# Patient Record
Sex: MALE | Race: WHITE | URBAN - METROPOLITAN AREA
[De-identification: names, ages, dates, MRNs, and addresses within clinical notes are randomized per-mention and may not be internally consistent; named-entity substitution may affect disease eponyms.]

---

## 2017-06-07 ENCOUNTER — TRANSFERRED RECORDS (OUTPATIENT)
Dept: HEALTH INFORMATION MANAGEMENT | Facility: CLINIC | Age: 82
End: 2017-06-07

## 2017-06-14 ENCOUNTER — TRANSFERRED RECORDS (OUTPATIENT)
Dept: HEALTH INFORMATION MANAGEMENT | Facility: CLINIC | Age: 82
End: 2017-06-14

## 2017-06-16 ENCOUNTER — TRANSFERRED RECORDS (OUTPATIENT)
Dept: HEALTH INFORMATION MANAGEMENT | Facility: CLINIC | Age: 82
End: 2017-06-16

## 2017-09-12 ENCOUNTER — TRANSFERRED RECORDS (OUTPATIENT)
Dept: HEALTH INFORMATION MANAGEMENT | Facility: CLINIC | Age: 82
End: 2017-09-12

## 2017-09-20 ENCOUNTER — TRANSFERRED RECORDS (OUTPATIENT)
Dept: HEALTH INFORMATION MANAGEMENT | Facility: CLINIC | Age: 82
End: 2017-09-20

## 2017-09-27 ENCOUNTER — TRANSFERRED RECORDS (OUTPATIENT)
Dept: HEALTH INFORMATION MANAGEMENT | Facility: CLINIC | Age: 82
End: 2017-09-27

## 2017-09-28 ENCOUNTER — MEDICAL CORRESPONDENCE (OUTPATIENT)
Dept: HEALTH INFORMATION MANAGEMENT | Facility: CLINIC | Age: 82
End: 2017-09-28

## 2017-09-28 ENCOUNTER — PRE VISIT (OUTPATIENT)
Dept: RADIATION ONCOLOGY | Facility: CLINIC | Age: 82
End: 2017-09-28

## 2017-09-28 NOTE — TELEPHONE ENCOUNTER
1.  Date/reason for appt: 10/4/2017, bone mets    2.  Referring provider: Dr. Eden Funk    3.  Call to patient (Yes / No - short description): Yes, called patient to schedule consult    4.  Previous care at / records requested from: [facility] ([facility] is putting in records request), [facility] (faxed request for images and reports)

## 2017-10-02 NOTE — PROGRESS NOTES
RADIATION ONCOLOGY CONSULT NOTE    Date of Visit: Oct 4, 2017  Patient Name: Renny Arndt  MRN: [id number]  : 1935    Renny Arndt is being seen today for initial consultation at the request of Dr. Eden Funk for consideration of radiation therapy.    HISTORY OF PRESENT ILLNESS:  Mr. Arndt is a 82 year old male with metastatic prostate cancer and multiple myeloma.    May 2012: Diagnosed with IgG lambda multiple myeloma.    May 7, 2012: Found to have a right rib soft tissue mass measuring 6 cm with 2 additional lytic lesions in T12 and L4, biopsy confirmation of plasmacytoma. Bone marrow biopsy showed IgG lambda multiple myeloma with 30-40% atypical lambda clonal plasma cells and M spike of 4.85. ISS stage II based on albumin of 3.4 and beta-2 microglobulin of 3.7    2012 to 2013: He underwent treatment with Revlimid and dexamethasone.    May 2012: Started Aredia. Switched to Zometa on May 29, 2013.    2014: Diagnosed with Earlimart 5+4 prostate adenocarcinoma with baseline PSA of 15, on prostate biopsy with 6 out of 7 cores on the right and 9 of 9 cores on the left with Earlimart 4+5; involved imaging also showed multiple enlarged pelvic lymph nodes. He began androgen deprivation therapy.    2016: Underwent orchiectomy.     May 2016: Initiated Enzalutamide.    2016 PSA neeru to 15: Patient started on abiraterone and prednisone.    2017. PSA continued to rise and patient began Taxotere. This was later discontinued due to actively bleeding peptic ulcers.     2017: Bone scan showed increased uptake in the right upper cervical spine, bilateral ribs, left T3 transverse process, and left pelvis that were stable, along with several new small left pelvic lesions. No uptake in numerous sites of bone lucencies representing patient's known multiple myeloma. MRI of R tib/fib showed no cortical or intramedullary lesions; no fracture; only nonspecific edema in multiple  muscle groups.    July 20, 2017: Initiated bicalutamide.    He recently presented with right leg pain. MRI on 9/27/2017 showed numerous marrow replacing lesions in the lower lumbar spine, pelvis, and proximal hips. There was an extensive metastatic lesion in the L5 vertebral body extending into the right pedicle and transverse process with edema and enhancement in the soft tissues adjacent, consistent with pathologic fracture with small hematoma or metastatic extension into the soft tissue with tissue necrosis. There is also an additional nonenhancing complex heterogeneous area posterior to the right L5 pedicle measuring 3.5 x 1.5 x 1 cm. There was also a moderate sized metastatic focus in the posterior left acetabulum measuring 3.1 x 3.2 x 4.1 cm.     9/2017: PSA ~52-53    Today he states he has pain in his right lower back which is rated 6 out of 10 in severity at its worst. He occasionally takes hydrocodone and Tylenol for the pain. This pain has been present for about a year however it became noticeably worse over the summer. There was no inciting event that he can remember that caused this. The pain is aggravated by certain positions such as sitting for long time, but he is able to walk without difficulty. He has no weakness in his lower extremities. He does have paresthesias and numbness in his right medial calf. He denies any bowel or bladder incontinence or saddle anesthesia. He is currently on Revlimid and bicalutamide and tolerating them well. He is able to run errands on occasion but spends most of his time at home, mostly sedentary.    CHEMOTHERAPY HISTORY: Revlimid, zometa, taxotere, and ADT (enzalutamide, abiraterone, bicalutamide) as above    RADIATION THERAPY HISTORY:  none    PAST MEDICAL/SURGICAL HISTORY:  Past Medical History:   Diagnosis Date     AV block, 1st degree      BPH (benign prostatic hyperplasia)      Hyperlipidemia      Hypertension      Hypokalemia      Multiple myeloma (H)      PAD  (peripheral artery disease) (H)      Prostate cancer (H)      PVC's (premature ventricular contractions)      Past Surgical History:   Procedure Laterality Date     arthroscopic knee surgery  1981     BACK SURGERY  1970     BIOPSY OF PROSTATE,NEEDLE/PUNCH  12/12/2014     carotid artery surgery  2006     cataract removal  12/10/2009     COLONOSCOPY  05/18/2017     CYSTOSCOPY  12/12/2014     HERNIA REPAIR  2005       ALLERGIES:  Allergies as of 10/04/2017 - Pierre as Reviewed 10/04/2017   Allergen Reaction Noted     Amoxicillin  10/28/2015       MEDICATIONS:  Current Outpatient Prescriptions   Medication Sig Dispense Refill     acetaminophen (TYLENOL) 325 MG tablet Take 325 mg by mouth every 4 hours if needed. Max acetaminophen dose: 4000mg in 24 hrs.       aspirin EC 81 MG EC tablet Take 81 mg by mouth       atorvastatin (LIPITOR) 10 MG tablet Take 1 tablet by mouth once daily.       bicalutamide (CASODEX) 50 MG tablet   3     calcium carb 1250 mg, 500 mg [place],/vitamin D 200 units (OSCAL WITH D) 500-200 MG-UNIT per tablet Take 1 tablet by mouth once daily with a meal.       cholecalciferol (VITAMIN D-1000 MAX ST) 1000 UNITS TABS Take 1,000 Units by mouth       HYDROcodone-acetaminophen (NORCO) 5-325 MG per tablet   0     LENalidomide (REVLIMID) 10 MG CAPS capsule CHEMO        losartan (COZAAR) 50 MG tablet           FAMILY HISTORY:  Family History   Problem Relation Age of Onset     CANCER Mother 98     unknown type, not treated     Breast Cancer Sister 70       SOCIAL HISTORY:  Social History     Social History     Marital status:      Spouse name: N/A     Number of children: N/A     Years of education: N/A     Occupational History     Not on file.     Social History Main Topics     Smoking status: Never Smoker     Smokeless tobacco: Never Used     Alcohol use No     Drug use: No     Sexual activity: Not on file     Other Topics Concern     Not on file     Social History Narrative       REVIEW OF SYSTEMS: A  "10-point review of systems was obtained. Pertinent findings are noted in the HPI and are otherwise unremarkable.     PHYSICAL EXAM:  VITALS: /63 (BP Location: Left arm, Patient Position: Sitting, Cuff Size: Adult Regular)  Pulse 74  Temp 98.1  F (36.7  C) (Oral)  Resp 16  Ht 5' 11\"  Wt 168 lb  SpO2 98%  BMI 23.43 kg/m2  GEN: appears well, in no acute distress  HEENT: normocephalic and atraumatic, EOMI, anicteric sclerae  CV:  regular rate and rhythm, no murmurs gallops or rubs, no JVD  RESP:  clear to auscultation bilaterally normal respiration on room air, no stridor  ABDOMEN: soft, NT, ND  SKIN: normal color and turgor  MSK: moving all extremities well, 5 out of 5 strength in all 4 extremities, slightly decreased sensation to light touch in medial right calf, no foot drop, and doing well. No tenderness along entire length of spine and paraspinal areas   LYMPHATICS: no cervical or supraclavicular LAD  NEURO: CN II-XII grossly intact, no focal neurologic deficit  PSYCH: appropriate mood, affect, and judgment    ECOG PERFORMANCE STATUS:  1    l pertinent laboratory, imaging, and pathology findings have been reviewed.     IMPRESSION AND RECOMMENDATIONS:  In summary, Mr. Arndt is a 82 year old male withmetastatic prostate cancer and multiple myeloma. He presents with progressive pain in his lumbosacral spine with MRI evidence of a metastatic lesion at L5 with extension into the soft tissue on the right as well as the transverse process and right pedicle.he also has pain and paresthesias in his right lower extremity that correspond to the L5 dermatome and a normal MRI of the right lower extremity, suggesting that his symptoms are secondary to his L5 lesion. He does have multiple other areas of metastatic involvement, but he is asymptomatic from them at this time and as such I would not recommend treating them with palliative radiation.     In order to improve his pain and reduce the risk of local " progression which could cause neurologic symptoms, I recommended that he have palliative radiation therapy to the lesion in his lumbar spine at L5. Since this area did not appear to correspond to uptake on the bone scan, I believe it is secondary to his multiple myeloma rather than his prostate cancer. I discussed that sometimes multiple myeloma lesions are treated to a lower dose of radiation, but I recommended that a standard dose of 30 Gy in 10 fractions be delivered, as this would still be well tolerated and there is some uncertainty as to whether or not this represents a myelomatous lesion or prostate cancer metastasis. Furthermore, given the size and soft tissue extension of the lesion, the local control may be inferior with a lower dose.     The risks, benefits, alternatives, and logistics to radiation therapy were discussed in detail. Such side effects could include, but are not limited to, fatigue, nausea and vomiting, diarrhea and loose stools, urinary symptoms such as frequency and urgency, bowel obstruction or perforation, vertebral body fracture and worsening pain. He is aware that the side effects of radiation therapy may be severe and permanent, although we expect that such risks would be low and that they are outweighed by the benefit of treatment. He was given the opportunity to ask questions, which were answered. Informed consent was obtained.    Edy Diana M.D.  Attending Physician  Radiation Oncology  Pager #[phone]

## 2017-10-03 ENCOUNTER — PRE VISIT (OUTPATIENT)
Dept: RADIATION ONCOLOGY | Facility: CLINIC | Age: 82
End: 2017-10-03

## 2017-10-03 ENCOUNTER — TELEPHONE (OUTPATIENT)
Dept: RADIATION ONCOLOGY | Facility: CLINIC | Age: 82
End: 2017-10-03

## 2017-10-04 ENCOUNTER — OFFICE VISIT (OUTPATIENT)
Dept: RADIATION ONCOLOGY | Facility: CLINIC | Age: 82
End: 2017-10-04
Payer: COMMERCIAL

## 2017-10-04 ENCOUNTER — TELEPHONE (OUTPATIENT)
Dept: RADIATION ONCOLOGY | Facility: CLINIC | Age: 82
End: 2017-10-04

## 2017-10-04 ENCOUNTER — APPOINTMENT (OUTPATIENT)
Dept: RADIATION ONCOLOGY | Facility: CLINIC | Age: 82
End: 2017-10-04
Payer: COMMERCIAL

## 2017-10-04 VITALS
SYSTOLIC BLOOD PRESSURE: 155 MMHG | OXYGEN SATURATION: 98 % | HEART RATE: 74 BPM | TEMPERATURE: 98.1 F | HEIGHT: 71 IN | RESPIRATION RATE: 16 BRPM | DIASTOLIC BLOOD PRESSURE: 63 MMHG | WEIGHT: 168 LBS | BODY MASS INDEX: 23.52 KG/M2

## 2017-10-04 DIAGNOSIS — C61 MALIGNANT NEOPLASM OF PROSTATE (H): ICD-10-CM

## 2017-10-04 DIAGNOSIS — C90.00 MULTIPLE MYELOMA NOT HAVING ACHIEVED REMISSION (H): Primary | ICD-10-CM

## 2017-10-04 PROCEDURE — 99205 OFFICE O/P NEW HI 60 MIN: CPT | Mod: 25 | Performed by: RADIOLOGY

## 2017-10-04 PROCEDURE — 77290 THER RAD SIMULAJ FIELD CPLX: CPT | Performed by: RADIOLOGY

## 2017-10-04 PROCEDURE — 77263 THER RADIOLOGY TX PLNG CPLX: CPT | Performed by: RADIOLOGY

## 2017-10-04 PROCEDURE — 77334 RADIATION TREATMENT AID(S): CPT | Performed by: RADIOLOGY

## 2017-10-04 RX ORDER — LENALIDOMIDE 10 MG/1
CAPSULE ORAL
COMMUNITY

## 2017-10-04 RX ORDER — ACETAMINOPHEN 325 MG/1
TABLET ORAL
COMMUNITY
Start: 2015-12-09

## 2017-10-04 RX ORDER — BICALUTAMIDE 50 MG/1
TABLET, FILM COATED ORAL
Refills: 3 | COMMUNITY
Start: 2017-09-28

## 2017-10-04 RX ORDER — ASPIRIN 81 MG/1
81 TABLET ORAL
COMMUNITY
Start: 2017-05-24

## 2017-10-04 RX ORDER — LOSARTAN POTASSIUM 50 MG/1
TABLET ORAL
COMMUNITY
Start: 2017-07-17

## 2017-10-04 RX ORDER — HYDROCODONE BITARTRATE AND ACETAMINOPHEN 5; 325 MG/1; MG/1
TABLET ORAL
Refills: 0 | COMMUNITY
Start: 2017-09-12

## 2017-10-04 RX ORDER — ATORVASTATIN CALCIUM 10 MG/1
TABLET, FILM COATED ORAL
COMMUNITY
Start: 2015-12-09

## 2017-10-04 ASSESSMENT — ENCOUNTER SYMPTOMS
EYES NEGATIVE: 1
BRUISES/BLEEDS EASILY: 1
SINUS PAIN: 0
SENSORY CHANGE: 0
TREMORS: 0
NECK PAIN: 0
DEPRESSION: 0
FREQUENCY: 1
NERVOUS/ANXIOUS: 0
DIAPHORESIS: 0
HEMATURIA: 0
FLANK PAIN: 0
INSOMNIA: 1
SEIZURES: 0
POLYDIPSIA: 0
MYALGIAS: 0
CHILLS: 0
WEIGHT LOSS: 1
FOCAL WEAKNESS: 0
FALLS: 0
PND: 0
LOSS OF CONSCIOUSNESS: 0
PALPITATIONS: 0
MEMORY LOSS: 0
GASTROINTESTINAL NEGATIVE: 1
STRIDOR: 0
SPEECH CHANGE: 0
DIZZINESS: 0
CLAUDICATION: 0
SORE THROAT: 0
ORTHOPNEA: 0
FEVER: 0
WEAKNESS: 1
TINGLING: 1
HALLUCINATIONS: 0
RESPIRATORY NEGATIVE: 1
BACK PAIN: 1
HEADACHES: 0
DYSURIA: 0

## 2017-10-04 ASSESSMENT — PAIN SCALES - GENERAL: PAINLEVEL: MILD PAIN (3)

## 2017-10-04 ASSESSMENT — LIFESTYLE VARIABLES: SUBSTANCE_ABUSE: 0

## 2017-10-04 NOTE — MR AVS SNAPSHOT
After Visit Summary   10/4/2017    Renny Arndt    MRN: [id number]           Patient Information     Date Of Birth          1935        Visit Information        Provider Department      10/4/2017 10:00 AM Edy Diana MD [facility]        Today's Diagnoses     Multiple myeloma not having achieved remission (H)    -  1    Malignant neoplasm of prostate (H)          Care Instructions          What to expect at your Simulation visit:    You will meet with a Radiation Therapist and other team members who will be doing a planning session called a  simulation  with you. This process will determine your daily treatment.    ~ You will lie on a flat table and have a treatment planning CT scan.  It is important during the scan to hold very still and breathe normally.    ~ Your therapist may construct a body mold to help you hold still for your treatments.    ~ If you are having treatment to the head or neck area you will be fitted with a plastic mesh mask that fits very snugly over your face and neck.     ~ Your therapist will be taking some digital photos that will go in your treatment chart.      ~Your therapist will make marks on your skin and take measurements. Your therapist may ask you about making small tattoos (a permanent small dot) over these marks.  These marks are used to position you daily for your radiation therapy treatments. Please do not wash off any marks until all of your radiation therapy treatments are complete unless you are instructed to do so by your therapist.    ~ Once the simulation is completed it can take from 3 to 10 business days before you start radiation therapy treatments.    ~ You may meet with a nurse who will go over management of treatment side effects and self care during your treatments. The nurse will help to plan care with other departments and physicians if needed.    Please contact [facility] RN with questions or  concerns following today's appointment: [phone].    Thank you!    Krista Claros, RN  RN Care Coordinator, Radiation Therapy  [facility]            Follow-ups after your visit        Who to contact     If you have questions or need follow up information about today's clinic visit or your schedule please contact [facility] directly at [phone].  Normal or non-critical lab and imaging results will be communicated to you by [employer], letter or phone within 4 business days after the clinic has received the results. If you do not hear from us within 7 days, please contact the clinic through [employer] or phone. If you have a critical or abnormal lab result, we will notify you by phone as soon as possible.  Submit refill requests through "[employer]" or call your pharmacy and they will forward the refill request to us. Please allow 3 business days for your refill to be completed.          Additional Information About Your Visit        [employer] Information     "[employer]" is an electronic gateway that provides easy, online access to your medical records. With "[employer]", you can request a clinic appointment, read your test results, renew a prescription or communicate with your care team.     To sign up for "[employer]" visit the website at [email or web address]   You will be asked to enter the access code listed below, as well as some personal information. Please follow the directions to create your username and password.     Your access code is: [email or web address]  Expires: 2018 11:34 AM     Your access code will  in 90 days. If you need help or a new code, please contact your [facility] or call [phone] for assistance.        Care EveryWhere ID     This is your Care EveryWhere ID. This could be used by other organizations to access your [facility] medical records  [id number]        Your Vitals Were     Pulse Temperature Respirations Height Pulse Oximetry  "BMI (Body Mass Index)    74 98.1  F (36.7  C) (Oral) 16 5' 11\" 98% 23.43 kg/m2       Blood Pressure from Last 3 Encounters:   10/04/17 155/63    Weight from Last 3 Encounters:   10/04/17 168 lb              Today, you had the following     No orders found for display       Primary Care Provider    None Specified       No primary provider on file.        Equal Access to Services     : Hadii aad ku hadasho Soomaali, waaxda luqadaha, qaybta kaalmada adekearayada, ladonna meierin fen adekeara ellington lajose an . So [facility] [phone].    ATENCIÓN: Si habla español, tiene a self disposición servicios gratuitos de asistencia lingüística. Llame al [phone].    We comply with applicable federal civil rights laws and [place] laws. We do not discriminate on the basis of race, color, national origin, age, disability, sex, sexual orientation, or gender identity.            Thank you!     Thank you for choosing [facility]  for your care. Our goal is always to provide you with excellent care. Hearing back from our patients is one way we can continue to improve our services. Please take a few minutes to complete the written survey that you may receive in the mail after your visit with us. Thank you!             Your Updated Medication List - Protect others around you: Learn how to safely use, store and throw away your medicines at www.disposemymeds.org.          This list is accurate as of: 10/4/17 11:34 AM.  Always use your most recent med list.                   Brand Name Dispense Instructions for use Diagnosis    acetaminophen 325 MG tablet    TYLENOL     Take 325 mg by mouth every 4 hours if needed. Max acetaminophen dose: 4000mg in 24 hrs.        aspirin EC 81 MG EC tablet      Take 81 mg by mouth        atorvastatin 10 MG tablet    LIPITOR     Take 1 tablet by mouth once daily.        bicalutamide 50 MG tablet    CASODEX          calcium carb 1250 mg (500 mg [place])/vitamin D 200 units 500-200 MG-UNIT " per tablet    OSCAL with D     Take 1 tablet by mouth once daily with a meal.        HYDROcodone-acetaminophen 5-325 MG per tablet    NORCO          LENalidomide 10 MG Caps capsule CHEMO    REVLIMID          losartan 50 MG tablet    COZAAR          VITAMIN D-1000 MAX ST 1000 UNITS Tabs   Generic drug:  cholecalciferol      Take 1,000 Units by mouth

## 2017-10-04 NOTE — PATIENT INSTRUCTIONS
What to expect at your Simulation visit:    You will meet with a Radiation Therapist and other team members who will be doing a planning session called a  simulation  with you. This process will determine your daily treatment.    ~ You will lie on a flat table and have a treatment planning CT scan.  It is important during the scan to hold very still and breathe normally.    ~ Your therapist may construct a body mold to help you hold still for your treatments.    ~ If you are having treatment to the head or neck area you will be fitted with a plastic mesh mask that fits very snugly over your face and neck.     ~ Your therapist will be taking some digital photos that will go in your treatment chart.      ~Your therapist will make marks on your skin and take measurements. Your therapist may ask you about making small tattoos (a permanent small dot) over these marks.  These marks are used to position you daily for your radiation therapy treatments. Please do not wash off any marks until all of your radiation therapy treatments are complete unless you are instructed to do so by your therapist.    ~ Once the simulation is completed it can take from 3 to 10 business days before you start radiation therapy treatments.    ~ You may meet with a nurse who will go over management of treatment side effects and self care during your treatments. The nurse will help to plan care with other departments and physicians if needed.    Please contact [facility] RN with questions or concerns following today's appointment: [phone].    Thank you!    Krista Claros, RN  RN Care Coordinator, Radiation Therapy  [facility]

## 2017-10-04 NOTE — LETTER
10/4/2017         RE: Renny Arndt  [address]   [facility] [place] [address]        Dear Colleague,    Thank you for referring your patient, Renny Arndt, to the [facility]. Please see a copy of my visit note below.    RADIATION ONCOLOGY CONSULT NOTE    Date of Visit: Oct 4, 2017  Patient Name: Renny Arndt  MRN: [id number]  : 1935    Renny Arndt is being seen today for initial consultation at the request of Dr. Eden Funk for consideration of radiation therapy.    HISTORY OF PRESENT ILLNESS:  Mr. Arndt is a 82 year old male with metastatic prostate cancer and multiple myeloma.    May 2012: Diagnosed with IgG lambda multiple myeloma.    May 7, 2012: Found to have a right rib soft tissue mass measuring 6 cm with 2 additional lytic lesions in T12 and L4, biopsy confirmation of plasmacytoma. Bone marrow biopsy showed IgG lambda multiple myeloma with 30-40% atypical lambda clonal plasma cells and M spike of 4.85. ISS stage II based on albumin of 3.4 and beta-2 microglobulin of 3.7    2012 to 2013: He underwent treatment with Revlimid and dexamethasone.    May 2012: Started Aredia. Switched to Zometa on May 29, 2013.    2014: Diagnosed with [place] 5+4 prostate adenocarcinoma with baseline PSA of 15, on prostate biopsy with 6 out of 7 cores on the right and 9 of 9 cores on the left with [place] 4+5; involved imaging also showed multiple enlarged pelvic lymph nodes. He began androgen deprivation therapy.    2016: Underwent orchiectomy.     May 2016: Initiated Enzalutamide.    2016 PSA neeru to 15: Patient started on abiraterone and prednisone.    2017. PSA continued to rise and patient began Taxotere. This was later discontinued due to actively bleeding peptic ulcers.     2017: Bone scan showed increased uptake in the right upper cervical spine, bilateral ribs, left T3 transverse process, and left pelvis that were stable, along  with several new small left pelvic lesions. No uptake in numerous sites of bone lucencies representing patient's known multiple myeloma. MRI of R tib/fib showed no cortical or intramedullary lesions; no fracture; only nonspecific edema in multiple muscle groups.    July 20, 2017: Initiated bicalutamide.    He recently presented with right leg pain. MRI on 9/27/2017 showed numerous marrow replacing lesions in the lower lumbar spine, pelvis, and proximal hips. There was an extensive metastatic lesion in the L5 vertebral body extending into the right pedicle and transverse process with edema and enhancement in the soft tissues adjacent, consistent with pathologic fracture with small hematoma or metastatic extension into the soft tissue with tissue necrosis. There is also an additional nonenhancing complex heterogeneous area posterior to the right L5 pedicle measuring 3.5 x 1.5 x 1 cm. There was also a moderate sized metastatic focus in the posterior left acetabulum measuring 3.1 x 3.2 x 4.1 cm.     9/2017: PSA ~52-53    Today he states he has pain in his right lower back which is rated 6 out of 10 in severity at its worst. He occasionally takes hydrocodone and Tylenol for the pain. This pain has been present for about a year however it became noticeably worse over the summer. There was no inciting event that he can remember that caused this. The pain is aggravated by certain positions such as sitting for long time, but he is able to walk without difficulty. He has no weakness in his lower extremities. He does have paresthesias and numbness in his right medial calf. He denies any bowel or bladder incontinence or saddle anesthesia. He is currently on Revlimid and bicalutamide and tolerating them well. He is able to run errands on occasion but spends most of his time at home, mostly sedentary.    CHEMOTHERAPY HISTORY: Revlimid, zometa, taxotere, and ADT (enzalutamide, abiraterone, bicalutamide) as above    RADIATION  THERAPY HISTORY:  none    PAST MEDICAL/SURGICAL HISTORY:  Past Medical History:   Diagnosis Date     AV block, 1st degree      BPH (benign prostatic hyperplasia)      Hyperlipidemia      Hypertension      Hypokalemia      Multiple myeloma (H)      PAD (peripheral artery disease) (H)      Prostate cancer (H)      PVC's (premature ventricular contractions)      Past Surgical History:   Procedure Laterality Date     arthroscopic knee surgery  1981     BACK SURGERY  1970     BIOPSY OF PROSTATE,NEEDLE/PUNCH  12/12/2014     carotid artery surgery  2006     cataract removal  12/10/2009     COLONOSCOPY  05/18/2017     CYSTOSCOPY  12/12/2014     HERNIA REPAIR  2005       ALLERGIES:  Allergies as of 10/04/2017 - Pierre as Reviewed 10/04/2017   Allergen Reaction Noted     Amoxicillin  10/28/2015       MEDICATIONS:  Current Outpatient Prescriptions   Medication Sig Dispense Refill     acetaminophen (TYLENOL) 325 MG tablet Take 325 mg by mouth every 4 hours if needed. Max acetaminophen dose: 4000mg in 24 hrs.       aspirin EC 81 MG EC tablet Take 81 mg by mouth       atorvastatin (LIPITOR) 10 MG tablet Take 1 tablet by mouth once daily.       bicalutamide (CASODEX) 50 MG tablet   3     calcium carb 1250 mg, 500 mg [place],/vitamin D 200 units (OSCAL WITH D) 500-200 MG-UNIT per tablet Take 1 tablet by mouth once daily with a meal.       cholecalciferol (VITAMIN D-1000 MAX ST) 1000 UNITS TABS Take 1,000 Units by mouth       HYDROcodone-acetaminophen (NORCO) 5-325 MG per tablet   0     LENalidomide (REVLIMID) 10 MG CAPS capsule CHEMO        losartan (COZAAR) 50 MG tablet           FAMILY HISTORY:  Family History   Problem Relation Age of Onset     CANCER Mother 98     unknown type, not treated     Breast Cancer Sister 70       SOCIAL HISTORY:  Social History     Social History     Marital status:      Spouse name: N/A     Number of children: N/A     Years of education: N/A     Occupational History     Not on file.     Social  "History Main Topics     Smoking status: Never Smoker     Smokeless tobacco: Never Used     Alcohol use No     Drug use: No     Sexual activity: Not on file     Other Topics Concern     Not on file     Social History Narrative       REVIEW OF SYSTEMS: A 10-point review of systems was obtained. Pertinent findings are noted in the HPI and are otherwise unremarkable.     PHYSICAL EXAM:  VITALS: /63 (BP Location: Left arm, Patient Position: Sitting, Cuff Size: Adult Regular)  Pulse 74  Temp 98.1  F (36.7  C) (Oral)  Resp 16  Ht 5' 11\"  Wt 168 lb  SpO2 98%  BMI 23.43 kg/m2  GEN: appears well, in no acute distress  HEENT: normocephalic and atraumatic, EOMI, anicteric sclerae  CV:  regular rate and rhythm, no murmurs gallops or rubs, no JVD  RESP:  clear to auscultation bilaterally normal respiration on room air, no stridor  ABDOMEN: soft, NT, ND  SKIN: normal color and turgor  MSK: moving all extremities well, 5 out of 5 strength in all 4 extremities, slightly decreased sensation to light touch in medial right calf, no foot drop, and doing well. No tenderness along entire length of spine and paraspinal areas   LYMPHATICS: no cervical or supraclavicular LAD  NEURO: CN II-XII grossly intact, no focal neurologic deficit  PSYCH: appropriate mood, affect, and judgment    ECOG PERFORMANCE STATUS:  1    l pertinent laboratory, imaging, and pathology findings have been reviewed.     IMPRESSION AND RECOMMENDATIONS:  In summary, Mr. Arndt is a 82 year old male withmetastatic prostate cancer and multiple myeloma. He presents with progressive pain in his lumbosacral spine with MRI evidence of a metastatic lesion at L5 with extension into the soft tissue on the right as well as the transverse process and right pedicle.he also has pain and paresthesias in his right lower extremity that correspond to the L5 dermatome and a normal MRI of the right lower extremity, suggesting that his symptoms are secondary to his L5 lesion. " He does have multiple other areas of metastatic involvement, but he is asymptomatic from them at this time and as such I would not recommend treating them with palliative radiation.     In order to improve his pain and reduce the risk of local progression which could cause neurologic symptoms, I recommended that he have palliative radiation therapy to the lesion in his lumbar spine at L5. Since this area did not appear to correspond to uptake on the bone scan, I believe it is secondary to his multiple myeloma rather than his prostate cancer. I discussed that sometimes multiple myeloma lesions are treated to a lower dose of radiation, but I recommended that a standard dose of 30 Gy in 10 fractions be delivered, as this would still be well tolerated and there is some uncertainty as to whether or not this represents a myelomatous lesion or prostate cancer metastasis. Furthermore, given the size and soft tissue extension of the lesion, the local control may be inferior with a lower dose.     The risks, benefits, alternatives, and logistics to radiation therapy were discussed in detail. Such side effects could include, but are not limited to, fatigue, nausea and vomiting, diarrhea and loose stools, urinary symptoms such as frequency and urgency, bowel obstruction or perforation, vertebral body fracture and worsening pain. He is aware that the side effects of radiation therapy may be severe and permanent, although we expect that such risks would be low and that they are outweighed by the benefit of treatment. He was given the opportunity to ask questions, which were answered. Informed consent was obtained.    Edy Diana M.D.  Attending Physician  Radiation Oncology  Pager #[phone]        HPI      Review of Systems   Constitutional: Positive for malaise/fatigue and weight loss. Negative for chills, diaphoresis and fever.        Patient reports slow and steady 10 lb weight loss over the past year. Patient reports weakness  in right leg associated with painful area   HENT: Positive for hearing loss. Negative for congestion, ear discharge, ear pain, nosebleeds, sinus pain, sore throat and tinnitus.    Eyes: Negative.    Respiratory: Negative.  Negative for stridor.    Cardiovascular: Positive for leg swelling. Negative for chest pain, palpitations, orthopnea, claudication and PND.        Patient reports right lower extremity edema   Gastrointestinal: Negative.    Genitourinary: Positive for frequency. Negative for dysuria, flank pain, hematuria and urgency.        Patient reports getting up 2-3 times per night to void.   Musculoskeletal: Positive for back pain and joint pain. Negative for falls, myalgias and neck pain.        Patient reports right-sided low back pain, and right hip pain   Skin: Positive for rash. Negative for itching.        Patient reports rash on bottom of right foot.    Neurological: Positive for tingling and weakness. Negative for dizziness, tremors, sensory change, speech change, focal weakness, seizures, loss of consciousness and headaches.        Patient reports tingling in feet after chemo   Endo/Heme/Allergies: Negative for environmental allergies and polydipsia. Bruises/bleeds easily.        Patient reports he easily bruises/bleeds since going through chemo   Psychiatric/Behavioral: Negative for depression, hallucinations, memory loss, substance abuse and suicidal ideas. The patient has insomnia. The patient is not nervous/anxious.         Patient reports not being able to sleep through the night, partially due to having to get up and void       INITIAL PATIENT ASSESSMENT    Diagnosis: Multiple Myeloma and Prostate Cancer    Prior radiation therapy: None    Prior chemotherapy:     Protocol:   Facility:   Dates:         Prior hormonal therapy:Yes: enzulatamine, zytiga, casodex,     Pain Eval:  Current history of pain associated with this visit:   Intensity: 3/10  Current: aching  Location: Right lower back and  right hip  Treatment: [place]      Psychosocial  Living arrangements: Lives in a [facility] in [place] with wife  Fall Risk: independent   referral needs: Not needed    Advanced Directive: Yes - Location: [facility]  Implantable Cardiac Device? No    Reproductive note: Reproductive history not collected for male patient    Nurse face-to-face time: Level 5:  over 15 min face to face time          Again, thank you for allowing me to participate in the care of your patient.        Sincerely,        Edy Diana MD

## 2017-10-04 NOTE — PROGRESS NOTES
HPI      Review of Systems   Constitutional: Positive for malaise/fatigue and weight loss. Negative for chills, diaphoresis and fever.        Patient reports slow and steady 10 lb weight loss over the past year. Patient reports weakness in right leg associated with painful area   HENT: Positive for hearing loss. Negative for congestion, ear discharge, ear pain, nosebleeds, sinus pain, sore throat and tinnitus.    Eyes: Negative.    Respiratory: Negative.  Negative for stridor.    Cardiovascular: Positive for leg swelling. Negative for chest pain, palpitations, orthopnea, claudication and PND.        Patient reports right lower extremity edema   Gastrointestinal: Negative.    Genitourinary: Positive for frequency. Negative for dysuria, flank pain, hematuria and urgency.        Patient reports getting up 2-3 times per night to void.   Musculoskeletal: Positive for back pain and joint pain. Negative for falls, myalgias and neck pain.        Patient reports right-sided low back pain, and right hip pain   Skin: Positive for rash. Negative for itching.        Patient reports rash on bottom of right foot.    Neurological: Positive for tingling and weakness. Negative for dizziness, tremors, sensory change, speech change, focal weakness, seizures, loss of consciousness and headaches.        Patient reports tingling in feet after chemo   Endo/Heme/Allergies: Negative for environmental allergies and polydipsia. Bruises/bleeds easily.        Patient reports he easily bruises/bleeds since going through chemo   Psychiatric/Behavioral: Negative for depression, hallucinations, memory loss, substance abuse and suicidal ideas. The patient has insomnia. The patient is not nervous/anxious.         Patient reports not being able to sleep through the night, partially due to having to get up and void       INITIAL PATIENT ASSESSMENT    Diagnosis: Multiple Myeloma and Prostate Cancer    Prior radiation therapy: None    Prior  chemotherapy:     Protocol: Revlimid  Facility: [facility]  Dates: 06/2012 - Present    Protocol: Aredia  Facility: [facility]  Dates: 05/31/2012 - 05/29/2013    Protocol: Zometa  Facility: [facility]  Dates: 05/31/2013 - Present    Protocol: Taxotere  Facility: [facility]  Dates: 03/01/2017 - 05/2017 (only one dose - was d/c d/t bleeding ulcers    Prior hormonal therapy:Yes: enzulatamine, zytiga, casodex,     Pain Eval:  Current history of pain associated with this visit:   Intensity: 3/10  Current: aching  Location: Right lower back and right hip  Treatment: [place]      Psychosocial  Living arrangements: [place] [facility]  Fall Risk: independent   referral needs: Not needed    Advanced Directive: Yes - Location: [facility]  Implantable Cardiac Device? No    Reproductive note: Reproductive history not collected for male patient    Nurse face-to-face time: Level 5:  over 15 min face to face time

## 2017-10-06 ENCOUNTER — APPOINTMENT (OUTPATIENT)
Dept: RADIATION ONCOLOGY | Facility: CLINIC | Age: 82
End: 2017-10-06
Payer: COMMERCIAL

## 2017-10-06 PROCEDURE — 77306 TELETHX ISODOSE PLAN SIMPLE: CPT | Performed by: RADIOLOGY

## 2017-10-09 ENCOUNTER — OFFICE VISIT (OUTPATIENT)
Dept: RADIATION ONCOLOGY | Facility: CLINIC | Age: 82
End: 2017-10-09
Payer: COMMERCIAL

## 2017-10-09 ENCOUNTER — APPOINTMENT (OUTPATIENT)
Dept: RADIATION ONCOLOGY | Facility: CLINIC | Age: 82
End: 2017-10-09
Payer: COMMERCIAL

## 2017-10-09 VITALS — WEIGHT: 165 LBS | BODY MASS INDEX: 23.01 KG/M2

## 2017-10-09 DIAGNOSIS — C90.00 MULTIPLE MYELOMA NOT HAVING ACHIEVED REMISSION (H): Primary | ICD-10-CM

## 2017-10-09 PROCEDURE — 99207 ZZC DROP WITH A PROCEDURE: CPT | Performed by: RADIOLOGY

## 2017-10-09 PROCEDURE — 77280 THER RAD SIMULAJ FIELD SMPL: CPT | Performed by: RADIOLOGY

## 2017-10-09 PROCEDURE — 77402 RADIATION TX DELIVERY LVL 1: CPT | Performed by: RADIOLOGY

## 2017-10-09 ASSESSMENT — PAIN SCALES - GENERAL: PAINLEVEL: MODERATE PAIN (4)

## 2017-10-09 NOTE — MR AVS SNAPSHOT
After Visit Summary   10/9/2017    Renny Arndt    MRN: [id number]           Patient Information     Date Of Birth          1935        Visit Information        Provider Department      10/9/2017 10:00 AM Edy Diana MD [facility]        Today's Diagnoses     Multiple myeloma not having achieved remission (H)    -  1      Care Instructions    [facility] [place] [facility] RN with questions or concerns following today's appointment: [phone].    Thank you!    Krista Claros, RN  RN Care Coordinator, Radiation Therapy  [facility]            Follow-ups after your visit        Your next 10 appointments already scheduled     Oct 10, 2017  2:30 PM CDT   TREATMENT with RADIATION THERAPIST   [facility] ([facility]    [address] [facility] [address]   [phone]            Oct 11, 2017  2:30 PM CDT   TREATMENT with RADIATION THERAPIST   [facility] ([facility])    [address] [facility] [address]   [phone]            Oct 12, 2017  2:30 PM CDT   TREATMENT with RADIATION THERAPIST   [facility] ([facility])    [address] [facility] [address]   [phone]            Oct 13, 2017  1:45 PM CDT   TREATMENT with RADIATION THERAPIST   [facility] ([facility])    [address] [facility] [address]   [phone]            Oct 16, 2017  3:00 PM CDT   TREATMENT with RADIATION THERAPIST   [facility] ([facility])    [address] [facility] [address]   [phone]            Oct 16, 2017  3:15 PM CDT   on treatment visit with Edy Diana MD   [facility] ([facility])    [address] [facility] [address]   [phone]            Oct 17, 2017  1:45 PM  CDT   TREATMENT with RADIATION THERAPIST   [facility]    [address] [facility] [address] [phone]            Oct 18, 2017  1:45 PM CDT   TREATMENT with RADIATION THERAPIST   [facility] ([facility])    [address] [facility] [address] [phone]            Oct 19, 2017  1:45 PM CDT   TREATMENT with RADIATION THERAPIST   [facility] ([facility])    [address] [facility] [address] [phone]            Oct 20, 2017  1:45 PM CDT   TREATMENT with RADIATION THERAPIST   [facility] ([facility])    [address] [facility] [id number] [phone]              Who to contact     If you have questions or need follow up information about today's clinic visit or your schedule please contact [facility] directly at [phone].  Normal or non-critical lab and imaging results will be communicated to you by [facility], letter or phone within 4 business days after the clinic has received the results. If you do not hear from us within 7 days, please contact the clinic through [employer] or phone. If you have a critical or abnormal lab result, we will notify you by phone as soon as possible.  Submit refill requests through [facility] or call your pharmacy and they will forward the refill request to us. Please allow 3 business days for your refill to be completed.          Additional Information About Your Visit        [facility] Information     [facility] is an electronic gateway that provides easy, online access to your medical records. With [facility], you can request a clinic appointment, read your test results, renew a prescription or communicate with your care team.     To sign up for [facility] visit the website at [email or web address]   You will be asked to enter the access code listed below, as well  as some personal information. Please follow the directions to create your username and password.     Your access code is: [email or web address]  Expires: 2018 11:34 AM     Your access code will  in 90 days. If you need help or a new code, please contact your [facility] or call [phone] for assistance.        Care EveryWhere ID     This is your Care EveryWhere ID. This could be used by other organizations to access your [facility] medical records  [id number]        Your Vitals Were     BMI (Body Mass Index)                   23.01 kg/m2            Blood Pressure from Last 3 Encounters:   10/04/17 155/63    Weight from Last 3 Encounters:   10/09/17 165 lb   10/04/17 168 lb              Today, you had the following     No orders found for display       Primary Care Provider Office Phone # Fax #    Shayne Wilkerson [phone]       [facility] [address]  [facility] [address]        Equal Access to Services     [facility]: Hadii aad ku hadasho Soomaali, waaxda luqadaha, qaybta kaalmada adeegyada, waxay idiin hayaan adeeg kharash la'aan . So [facility] [phone].    ATENCIÓN: Si habla español, tiene a self disposición servicios gratuitos de asistencia lingüística. Llame al [phone].    We comply with applicable federal civil rights laws and [place] laws. We do not discriminate on the basis of race, color, national origin, age, disability, sex, sexual orientation, or gender identity.            Thank you!     Thank you for choosing [facility]  for your care. Our goal is always to provide you with excellent care. Hearing back from our patients is one way we can continue to improve our services. Please take a few minutes to complete the written survey that you may receive in the mail after your visit with us. Thank you!             Your Updated Medication List - Protect others around you: Learn how to safely use, store and throw  away your medicines at www.disposemymeds.org.          This list is accurate as of: 10/9/17 10:34 AM.  Always use your most recent med list.                   Brand Name Dispense Instructions for use Diagnosis    acetaminophen 325 MG tablet    TYLENOL     Take 325 mg by mouth every 4 hours if needed. Max acetaminophen dose: 4000mg in 24 hrs.        aspirin EC 81 MG EC tablet      Take 81 mg by mouth        atorvastatin 10 MG tablet    LIPITOR     Take 1 tablet by mouth once daily.        bicalutamide 50 MG tablet    CASODEX          calcium carb 1250 mg (500 mg [place])/vitamin D 200 units 500-200 MG-UNIT per tablet    OSCAL with D     Take 1 tablet by mouth once daily with a meal.        HYDROcodone-acetaminophen 5-325 MG per tablet    NORCO          LENalidomide 10 MG Caps capsule CHEMO    REVLIMID          losartan 50 MG tablet    COZAAR          VITAMIN D-1000 MAX ST 1000 UNITS Tabs   Generic drug:  cholecalciferol      Take 1,000 Units by mouth

## 2017-10-09 NOTE — PROGRESS NOTES
[facility] PHYSICIANS  SPECIALIZING IN BREAKTHROUGHS  Radiation Oncology    On Treatment Visit Note      Renny Arndt      Date: 10/9/2017   MRN: [id number]   : 1935  Diagnosis: Multiple myeloma      Reason for Visit:  On Radiation Treatment Visit     Treatment Summary to Date  Treatment Site: L4 - S1 Current Dose: 300/3000 cGy Fractions: 1/10      Chemotherapy  Chemo concurrent with radx?: Yes  Oncologist: Dr. Eden Funk @ [facility]  Drug Name/Frequency 1: Revlimid  Drug Name/Frequency 1: Zometa    Subjective:   Begins radiation today, no new complaints, stable pain, ambulating well.    Nursing ROS:   Nutrition Alteration  Diet Type: Patient's Preference  Skin  Skin Reaction: 0 - No changes                 Psychosocial  Mood - Anxiety: 0 - Normal  Mood - Depression: 0 - Normal  Pyschosocial Note: Patient reports no change in energy level         Objective:   Wt 165 lb  BMI 23.01 kg/m2  NAD    Labs:  CBC RESULTS: No results for input(s): WBC, RBC, HGB, HCT, MCV, MCH, MCHC, RDW, PLT in the last 20545 hours.  ELECTROLYTES:  Recent Labs   Lab Test 12   POTASSIUM  4.2   CR  1.12   GLC  88       Assessment:    Tolerating radiation therapy well.  All questions and concerns addressed.    Plan:   1. Continue current therapy.        Mosaiq chart and setup information reviewed  Port images checked    Medication Review  Med list reviewed with patient?: Yes    Educational Topic Discussed  Education Instructions: Radiation therapy side effect education completed.      Edy Diana MD

## 2017-10-09 NOTE — PATIENT INSTRUCTIONS
Please contact [facility] RN with questions or concerns following today's appointment: [phone].    Thank you!    Krista Claros, RN  RN Care Coordinator, Radiation Therapy  [facility]

## 2017-10-10 ENCOUNTER — APPOINTMENT (OUTPATIENT)
Dept: RADIATION ONCOLOGY | Facility: CLINIC | Age: 82
End: 2017-10-10
Payer: COMMERCIAL

## 2017-10-10 PROCEDURE — 77402 RADIATION TX DELIVERY LVL 1: CPT | Performed by: RADIOLOGY

## 2017-10-11 ENCOUNTER — APPOINTMENT (OUTPATIENT)
Dept: RADIATION ONCOLOGY | Facility: CLINIC | Age: 82
End: 2017-10-11
Payer: COMMERCIAL

## 2017-10-11 PROCEDURE — 77402 RADIATION TX DELIVERY LVL 1: CPT | Performed by: RADIOLOGY

## 2017-10-12 ENCOUNTER — APPOINTMENT (OUTPATIENT)
Dept: RADIATION ONCOLOGY | Facility: CLINIC | Age: 82
End: 2017-10-12
Payer: COMMERCIAL

## 2017-10-12 PROCEDURE — 77402 RADIATION TX DELIVERY LVL 1: CPT | Performed by: RADIOLOGY

## 2017-10-13 ENCOUNTER — APPOINTMENT (OUTPATIENT)
Dept: RADIATION ONCOLOGY | Facility: CLINIC | Age: 82
End: 2017-10-13
Payer: COMMERCIAL

## 2017-10-13 PROCEDURE — 77402 RADIATION TX DELIVERY LVL 1: CPT | Performed by: RADIOLOGY

## 2017-10-13 PROCEDURE — 77427 RADIATION TX MANAGEMENT X5: CPT | Performed by: RADIOLOGY

## 2017-10-16 ENCOUNTER — APPOINTMENT (OUTPATIENT)
Dept: RADIATION ONCOLOGY | Facility: CLINIC | Age: 82
End: 2017-10-16
Payer: COMMERCIAL

## 2017-10-16 ENCOUNTER — OFFICE VISIT (OUTPATIENT)
Dept: RADIATION ONCOLOGY | Facility: CLINIC | Age: 82
End: 2017-10-16
Payer: COMMERCIAL

## 2017-10-16 VITALS — WEIGHT: 167.1 LBS | BODY MASS INDEX: 23.31 KG/M2

## 2017-10-16 DIAGNOSIS — C90.00 MULTIPLE MYELOMA NOT HAVING ACHIEVED REMISSION (H): Primary | ICD-10-CM

## 2017-10-16 PROCEDURE — 77417 THER RADIOLOGY PORT IMAGE(S): CPT | Performed by: RADIOLOGY

## 2017-10-16 PROCEDURE — 99207 ZZC DROP WITH A PROCEDURE: CPT | Performed by: RADIOLOGY

## 2017-10-16 PROCEDURE — 77402 RADIATION TX DELIVERY LVL 1: CPT | Performed by: RADIOLOGY

## 2017-10-16 ASSESSMENT — PAIN SCALES - GENERAL: PAINLEVEL: SEVERE PAIN (6)

## 2017-10-16 NOTE — PROGRESS NOTES
[facility] PHYSICIANS  SPECIALIZING IN BREAKTHROUGHS  Radiation Oncology    On Treatment Visit Note      Renny Arndt      Date: 10/16/2017   MRN: [id number]   : 1935  Diagnosis: Multiple myeloma      Reason for Visit:  On Radiation Treatment Visit     Treatment Summary to Date  Treatment Site: L4 - S1 Current Dose: 1500/3000 cGy Fractions: 5/10      Chemotherapy  Chemo concurrent with radx?: Yes  Oncologist: Dr. Eden Funk @ [facility]  Drug Name/Frequency 1: Revlimid  Drug Name/Frequency 1: Zometa    Subjective:   Feels that his R lower back pain is stable to slightly improved. Stable peripheral neuropathy in RLE. No skin changes or bowel difficulty.    Nursing ROS:               Cardiovascular  Respiratory effort: 1 - Normal - without distress        Psychosocial  Mood - Anxiety: 0 - Normal  Mood - Depression: 0 - Normal  Pyschosocial Note: Patient reports no change in energy level         Objective:   Wt 167 lb 1.6 oz  BMI 23.31 kg/m2  NAD    Labs:  CBC RESULTS: No results for input(s): WBC, RBC, HGB, HCT, MCV, MCH, MCHC, RDW, PLT in the last 78102 hours.  ELECTROLYTES:  Recent Labs   Lab Test 12   POTASSIUM  4.2   CR  1.12   GLC  88       Assessment:    Tolerating radiation therapy well.  All questions and concerns addressed.    Plan:   1. Continue current therapy.        Mosaiq chart and setup information reviewed      Medication Review  Med list reviewed with patient?: Yes           Edy Diana MD

## 2017-10-16 NOTE — MR AVS SNAPSHOT
After Visit Summary   10/16/2017    Renny Arndt    MRN: [id number]           Patient Information     Date Of Birth          1935        Visit Information        Provider Department      10/16/2017 3:15 PM Edy Diana MD [facility]        Today's Diagnoses     Multiple myeloma not having achieved remission (H)    -  1      Care Instructions    [facility] RN with questions or concerns following today's appointment: [phone].    Thank you!            Follow-ups after your visit        Your next 10 appointments already scheduled     Oct 17, 2017  1:45 PM CDT   TREATMENT with RADIATION THERAPIST   [facility] ([facility]    [address] [facility] [address] [phone]            Oct 18, 2017  1:45 PM CDT   TREATMENT with RADIATION THERAPIST   [facility] ([facility])    [address] [facility] [id number] [phone]            Oct 19, 2017  1:45 PM CDT   TREATMENT with RADIATION THERAPIST   [facility] ([facility])    [address] [facility] [address] [phone]            Oct 20, 2017  1:45 PM CDT   TREATMENT with RADIATION THERAPIST   [facility] ([facility])    [address] [facility] [id number] [phone]              Who to contact     If you have questions or need follow up information about today's clinic visit or your schedule please contact [facility] directly at [phone].  Normal or non-critical lab and imaging results will be communicated to you by MyChart, letter or phone within 4 business days after the clinic has received the results. If you do not hear from us within 7 days, please contact the clinic through MyChart or phone. If you have a critical or abnormal lab result, we will notify you by phone  as soon as possible.  Submit refill requests through [employer] or call your pharmacy and they will forward the refill request to us. Please allow 3 business days for your refill to be completed.          Additional Information About Your Visit        [employer] Information     [employer] is an electronic gateway that provides easy, online access to your medical records. With [employer], you can request a clinic appointment, read your test results, renew a prescription or communicate with your care team.     To sign up for [employer] visit the website at [email or web address]   You will be asked to enter the access code listed below, as well as some personal information. Please follow the directions to create your username and password.     Your access code is: [email or web address]  Expires: 2018 11:34 AM     Your access code will  in 90 days. If you need help or a new code, please contact your [facility] or call [phone] for assistance.        Care EveryWhere ID     This is your Care EveryWhere ID. This could be used by other organizations to access your [facility] medical records  [id number]        Your Vitals Were     BMI (Body Mass Index)                   23.31 kg/m2            Blood Pressure from Last 3 Encounters:   10/04/17 155/63    Weight from Last 3 Encounters:   10/16/17 167 lb 1.6 oz   10/09/17 165 lb   10/04/17 168 lb              Today, you had the following     No orders found for display       Primary Care Provider Office Phone # Fax #    Shayne Wilkerson [phone]       [facility] [address] [facility] [address]  [facility] [address]        Equal Access to Services     [facility]: Hadii aad ku hadasho Soomaali, waaxda luqadaha, qaybta kaalmada adeegyada, ladonna cooper haynatanael keyes . So [facility] [phone].    ATENCIÓN: Si habla español, tiene a self disposición servicios gratuitos de asistencia lingüística. Llame al [phone].    We  comply with applicable federal civil rights laws and [place] laws. We do not discriminate on the basis of race, color, national origin, age, disability, sex, sexual orientation, or gender identity.            Thank you!     Thank you for choosing [facility]  for your care. Our goal is always to provide you with excellent care. Hearing back from our patients is one way we can continue to improve our services. Please take a few minutes to complete the written survey that you may receive in the mail after your visit with us. Thank you!             Your Updated Medication List - Protect others around you: Learn how to safely use, store and throw away your medicines at www.disposemymeds.org.          This list is accurate as of: 10/16/17  3:23 PM.  Always use your most recent med list.                   Brand Name Dispense Instructions for use Diagnosis    acetaminophen 325 MG tablet    TYLENOL     Take 325 mg by mouth every 4 hours if needed. Max acetaminophen dose: 4000mg in 24 hrs.        aspirin EC 81 MG EC tablet      Take 81 mg by mouth        atorvastatin 10 MG tablet    LIPITOR     Take 1 tablet by mouth once daily.        bicalutamide 50 MG tablet    CASODEX          calcium carb 1250 mg (500 mg [place])/vitamin D 200 units 500-200 MG-UNIT per tablet    OSCAL with D     Take 1 tablet by mouth once daily with a meal.        HYDROcodone-acetaminophen 5-325 MG per tablet    NORCO          LENalidomide 10 MG Caps capsule CHEMO    REVLIMID          losartan 50 MG tablet    COZAAR          VITAMIN D-1000 [address] ST 1000 UNITS Tabs   Generic drug:  cholecalciferol      Take 1,000 Units by mouth

## 2017-10-17 ENCOUNTER — APPOINTMENT (OUTPATIENT)
Dept: RADIATION ONCOLOGY | Facility: CLINIC | Age: 82
End: 2017-10-17
Payer: COMMERCIAL

## 2017-10-17 PROCEDURE — 77402 RADIATION TX DELIVERY LVL 1: CPT | Performed by: RADIOLOGY

## 2017-10-18 ENCOUNTER — APPOINTMENT (OUTPATIENT)
Dept: RADIATION ONCOLOGY | Facility: CLINIC | Age: 82
End: 2017-10-18
Payer: COMMERCIAL

## 2017-10-18 ENCOUNTER — DOCUMENTATION ONLY (OUTPATIENT)
Dept: SPIRITUAL SERVICES | Facility: CLINIC | Age: 82
End: 2017-10-18

## 2017-10-18 DIAGNOSIS — Z71.81 SPIRITUAL OR RELIGIOUS COUNSELING: Primary | ICD-10-CM

## 2017-10-18 PROCEDURE — 77402 RADIATION TX DELIVERY LVL 1: CPT | Performed by: RADIOLOGY

## 2017-10-18 NOTE — PROGRESS NOTES
SPIRITUAL HEALTH SERVICES  SPIRITUAL ASSESSMENT Progress Note  [facility]     introduced himself to Renny Arndt and informed him of his availability.    Brandon Winn M.Div., [facility]  Staff   Office tel: [phone]

## 2017-10-19 ENCOUNTER — APPOINTMENT (OUTPATIENT)
Dept: RADIATION ONCOLOGY | Facility: CLINIC | Age: 82
End: 2017-10-19
Payer: COMMERCIAL

## 2017-10-19 PROCEDURE — 77402 RADIATION TX DELIVERY LVL 1: CPT | Performed by: RADIOLOGY

## 2017-10-20 ENCOUNTER — APPOINTMENT (OUTPATIENT)
Dept: RADIATION ONCOLOGY | Facility: CLINIC | Age: 82
End: 2017-10-20
Payer: COMMERCIAL

## 2017-10-20 ENCOUNTER — ONCOLOGY VISIT (OUTPATIENT)
Dept: RADIATION ONCOLOGY | Facility: CLINIC | Age: 82
End: 2017-10-20

## 2017-10-20 PROCEDURE — 77402 RADIATION TX DELIVERY LVL 1: CPT | Performed by: RADIOLOGY

## 2017-10-20 PROCEDURE — 77336 RADIATION PHYSICS CONSULT: CPT | Performed by: RADIOLOGY

## 2017-10-20 PROCEDURE — 77427 RADIATION TX MANAGEMENT X5: CPT | Performed by: RADIOLOGY

## 2017-10-24 NOTE — PROCEDURES
Radiotherapy Treatment Summary          Date of Report: 2017     PATIENT: ISIAH SNYDER  MEDICAL RECORD NO: [id number]  : 1935     DIAGNOSIS: C90.00 Multiple myeloma not having achieved remission  INTENT OF RADIOTHERAPY: Cure  PATHOLOGY:    multiple myeloma                               STAGE:   IV                        Details of the treatments summarized below are found in records kept in the Department of Radiation Oncology at [facility].     Treatment Summary:  Radiation Oncology - Course: 1    Treatment Site Current Dose Modality From  To  Elapsed Days Fx.  1 L4-S1 Spine   3,000 cGy 18 X  10/09/2017 10/20/2017  11  10                Dose per Fraction:     300 cGy   Total Dose:      3000 cGy        COMMENTS:    He tolerated palliative RT well with no significant acute side effects.                                           FOLLOW UP PLAN:      He will continue to follow up with Dr. Funk in medical oncology. He may follow up   with us on an as-needed basis.                      Staff Physician: Edy Diana M.D.  Physicist: Eileen Shahid MS     CC: Eden Funk MD              Radiation Oncology [address] [address] [place] [address] Phone: [phone]

## 2018-11-02 NOTE — TELEPHONE ENCOUNTER
Spoke with patients wife Meagan about consult date, time, and location with Dr Diana. Meagan stated she will inform patient of appointment and will be coming to visit with patient. Meagan verbalized understanding to appointment information    No indicators present